# Patient Record
(demographics unavailable — no encounter records)

---

## 2025-01-23 NOTE — PHYSICAL EXAM
[Alert] : alert [Oriented to Person] : oriented to person [Oriented to Place] : oriented to place [Oriented to Time] : oriented to time [Calm] : calm [de-identified] : He  is alert, well-groomed, and in NAD   [de-identified] : anicteric.  Nasal mucosa pink, septum midline. Oral mucosa pink.  Tongue midline, Pharynx without exudates.   [de-identified] : right buttock mass is  mobile, Firm,  Smooth, non-tender,   Well defined. Superficial . No palpable lymph nodes.   Mass size -  3 cm x 1   cm.

## 2025-01-23 NOTE — PLAN
[FreeTextEntry1] : Mr. BANKS  was told significance of findings, options, risks and benefits were explained.  Informed consent for excision right buttock mass , and potential risks, benefits and alternatives (surgical options were discussed including non-surgical options or the option of no surgery) to the planned surgery were discussed in depth.  All surgical options were discussed including non-surgical treatments.  He wishes to proceed with surgery.  We will plan for surgery on at the next available date, pending any required insurance pre-certification or pre-approval. He agrees to obtain any necessary pre-operative evaluations and testing prior to surgery. Patient advised to seek immediate medical attention with any acute change in symptoms or with the development of any new or worsening symptoms.  Patient's questions and concerns addressed to patient's satisfaction, and patient verbalized an understanding of the information discussed.

## 2025-01-23 NOTE — HISTORY OF PRESENT ILLNESS
[de-identified] : This is a 67 year  old patient who was referred by Dr. Dank Pedraza with the chief complaint of having  right buttock abscess   He reports having this    condition on and off  for over 1 year. He denies any trauma to the area.   He denies any fever or  night sweats. Appetite is good and weight is stable.  he reports multiple incision and drainages ( 4 times) .  He states that recently the mass started to  get  bigger and  more symptomatic. He wants to know if it could  be surgically  removed.

## 2025-01-23 NOTE — CONSULT LETTER
[Dear  ___] : Dear  [unfilled], [Consult Letter:] : I had the pleasure of evaluating your patient, [unfilled]. [Please see my note below.] : Please see my note below. [Consult Closing:] : Thank you very much for allowing me to participate in the care of this patient.  If you have any questions, please do not hesitate to contact me. [Sincerely,] : Sincerely, [FreeTextEntry3] : Max Demarco MD, FACS